# Patient Record
Sex: FEMALE | Race: WHITE | URBAN - METROPOLITAN AREA
[De-identification: names, ages, dates, MRNs, and addresses within clinical notes are randomized per-mention and may not be internally consistent; named-entity substitution may affect disease eponyms.]

---

## 2023-10-20 ENCOUNTER — OFFICE VISIT (OUTPATIENT)
Dept: ORTHOPEDIC SURGERY | Age: 23
End: 2023-10-20

## 2023-10-20 DIAGNOSIS — M79.674 GREAT TOE PAIN, RIGHT: Primary | ICD-10-CM

## 2023-10-20 RX ORDER — CEPHALEXIN 500 MG/1
500 CAPSULE ORAL 4 TIMES DAILY
Qty: 28 CAPSULE | Refills: 0 | Status: SHIPPED | OUTPATIENT
Start: 2023-10-20

## 2023-10-20 NOTE — PROGRESS NOTES
difficulties occur then present to an acute care center. I explained how this medication can cause GI distress. Normally this is tolerable. However if it becomes too much, I told them to call back and we can discuss alternative treatment. The nail was debrided today and cauterized. A sterile dressing was applied. We will put her on some antibiotics for this as well. She is can continue with Epsom salt soaks. I told her she can return if she has any questions or concerns we can consider partial removal of the nail if necessary.